# Patient Record
(demographics unavailable — no encounter records)

---

## 2024-11-04 NOTE — ADDENDUM
[FreeTextEntry1] : Entered by Balta Cespedes, acting as scribe for Dr. Alen Adams. The documentation recorded by the scribe accurately reflects the service I personally performed and the decisions made by me.

## 2024-11-04 NOTE — LETTER BODY
[FreeTextEntry1] : Wali Alberto MD [address] [place] [address] [phone]   Dear Dr. Alberto,        Reason for Visit: BPH. Urinary frequency, resolved.       This is a 55 year-old gentleman with symptoms of BPH and urinary frequency. Patient is here today for evaluation. Patient was last seen in 2016. Patient previously tried Flomax for a month but self-discontinued the medication since his urinary symptoms resolved. He denies any hematuria or urinary incontinence. He denies any alleviating factors. He reports no pain. All other review of systems are negative. He has no cancer in his family medical history. He has no previous surgical history. Past family history and social history were inquired and were noncontributory to current condition. The patient does not use tobacco or drink alcohol. Medications and allergies were reviewed. He has no known allergies to medication.       On examination, the patient is a healthy-appearing gentleman in no acute distress. He is alert and oriented follows commands. He has normal mood and affect. He is normocephalic. Neck is supple. Oral no thrush Respirations are unlabored. His abdomen is soft and nontender. Bladder is nonpalpable. No CVA tenderness. Neurologically he is grossly intact. No peripheral edema. Skin without gross abnormality.        Post-void residual on bladder scan today was 0 cc.       ASSESSMENT: BPH. Urinary frequency, resolved.       I counseled the patient. Patient previously tried a prescription of Flomax but discontinued after his urinary frequency resolved. His PVR today was 0 cc. Given that the patient's symptoms have resolved, I reassured the patient. I recommended the patient obtain PSA, BMP, urine culture, and urinalysis today to establish baseline.  Risks and alternatives were discussed. I answered the patient questions. Patient will contact us if his symptoms recur and provide a new urine sample as needed. Thank you for the opportunity to participate in the care of Mr. GUERRERO. I will keep you updated on his progress.       Plan: PSA. BMP. Urinalysis. Urine culture. Follow up in 6 months.

## 2024-11-04 NOTE — HISTORY OF PRESENT ILLNESS
[FreeTextEntry1] : New BPH with nocturia. Patient was last seen in 2016, tried Flomax for a month and self-stopped medication since symptom resolved. Patient reports periodic nocturia that he needs to get up 5-6 times per night, symptom resolved spontaneously for each episode. PVR 0ml today  Assessment: Frequent urination.  Now resolved.  Reassured patient.  PSA.  Urinalysis.  Follow-up 6 months.  Patient will contact us if his symptoms recur and provide a new urine sample as needed.  Please refer to URO Consult Note.

## 2024-11-04 NOTE — LETTER BODY
[FreeTextEntry1] : Wali Alberto MD [address] [place] [address] [phone]   Dear Dr. Alberto,        Reason for Visit: BPH. Urinary frequency, resolved.       This is a 55 year-old gentleman with symptoms of BPH and urinary frequency. Patient is here today for evaluation. Patient was last seen in 2016. Patient previously tried Flomax for a month but self-discontinued the medication since his urinary symptoms resolved. He denies any hematuria or urinary incontinence. He denies any alleviating factors. He reports no pain. All other review of systems are negative. He has no cancer in his family medical history. He has no previous surgical history. Past family history and social history were inquired and were noncontributory to current condition. The patient does not use tobacco or drink alcohol. Medications and allergies were reviewed. He has no known allergies to medication.       On examination, the patient is a healthy-appearing gentleman in no acute distress. He is alert and oriented follows commands. He has normal mood and affect. He is normocephalic. Neck is supple. Oral no thrush Respirations are unlabored. His abdomen is soft and nontender. Bladder is nonpalpable. No CVA tenderness. Neurologically he is grossly intact. No peripheral edema. Skin without gross abnormality.        Post-void residual on bladder scan today was 0 cc.       ASSESSMENT: BPH. Urinary frequency, resolved.       I counseled the patient. Patient previously tried a prescription of Flomax but discontinued after his urinary frequency resolved. His PVR today was 0 cc. Given that the patient's symptoms have resolved, I reassured the patient. I recommended the patient obtain PSA, BMP, urine culture, and urinalysis today to establish baseline.  Risks and alternatives were discussed. I answered the patient questions. Patient will contact us if his symptoms recur and provide a new urine sample as needed. Thank you for the opportunity to participate in the care of Mr. GUERRERO. I will keep you updated on his progress.       Plan: PSA. BMP. Urinalysis. Urine culture. Follow up in 6 months.